# Patient Record
Sex: FEMALE
[De-identification: names, ages, dates, MRNs, and addresses within clinical notes are randomized per-mention and may not be internally consistent; named-entity substitution may affect disease eponyms.]

---

## 2022-04-18 ENCOUNTER — RX ONLY (RX ONLY)
Age: 57
End: 2022-04-18

## 2022-04-18 ENCOUNTER — DOCTOR'S OFFICE (OUTPATIENT)
Dept: URBAN - NONMETROPOLITAN AREA CLINIC 1 | Facility: CLINIC | Age: 57
Setting detail: OPHTHALMOLOGY
End: 2022-04-18
Payer: COMMERCIAL

## 2022-04-18 DIAGNOSIS — H40.013: ICD-10-CM

## 2022-04-18 DIAGNOSIS — H02.433: ICD-10-CM

## 2022-04-18 DIAGNOSIS — H02.834: ICD-10-CM

## 2022-04-18 DIAGNOSIS — H02.403: ICD-10-CM

## 2022-04-18 DIAGNOSIS — H02.423: ICD-10-CM

## 2022-04-18 DIAGNOSIS — H04.123: ICD-10-CM

## 2022-04-18 DIAGNOSIS — H02.831: ICD-10-CM

## 2022-04-18 DIAGNOSIS — H25.13: ICD-10-CM

## 2022-04-18 DIAGNOSIS — H02.413: ICD-10-CM

## 2022-04-18 PROCEDURE — 76514 ECHO EXAM OF EYE THICKNESS: CPT | Performed by: OPHTHALMOLOGY

## 2022-04-18 PROCEDURE — 99204 OFFICE O/P NEW MOD 45 MIN: CPT | Performed by: OPHTHALMOLOGY

## 2022-04-18 PROCEDURE — 92133 CPTRZD OPH DX IMG PST SGM ON: CPT | Performed by: OPHTHALMOLOGY

## 2022-04-18 ASSESSMENT — CONFRONTATIONAL VISUAL FIELD TEST (CVF)
OD_FINDINGS: FULL
OS_FINDINGS: FULL

## 2022-04-18 ASSESSMENT — KERATOMETRY
OS_K1POWER_DIOPTERS: 43.75
OD_AXISANGLE_DEGREES: 062
OD_K2POWER_DIOPTERS: 44.50
OS_K2POWER_DIOPTERS: 45.00
OD_K1POWER_DIOPTERS: 43.75
OS_AXISANGLE_DEGREES: 089

## 2022-04-18 ASSESSMENT — REFRACTION_CURRENTRX
OS_OVR_VA: 20/
OD_VPRISM_DIRECTION: SV
OD_SPHERE: -2.00
OS_CYLINDER: -0.75
OS_SPHERE: -2.25
OS_VPRISM_DIRECTION: SV
OD_OVR_VA: 20/
OD_AXIS: 176
OS_AXIS: 005
OD_CYLINDER: -0.50

## 2022-04-18 ASSESSMENT — TEAR BREAK UP TIME (TBUT)
OD_TBUT: RAPID TBUT
OS_TBUT: RAPID TBUT

## 2022-04-18 ASSESSMENT — REFRACTION_AUTOREFRACTION
OD_CYLINDER: 0.00
OD_SPHERE: -1.50
OS_AXIS: 156
OS_CYLINDER: -1.00
OS_SPHERE: -1.50

## 2022-04-18 ASSESSMENT — PACHYMETRY
OD_CT_CORRECTION: 1
OS_CT_CORRECTION: 1
OD_CT_UM: 525
OS_CT_UM: 525

## 2022-04-18 ASSESSMENT — VISUAL ACUITY
OS_BCVA: 20/20-2
OD_BCVA: 20/25+2

## 2022-04-18 ASSESSMENT — LID POSITION - DERMATOCHALASIS
OS_DERMATOCHALASIS: 1+
OD_DERMATOCHALASIS: 1+

## 2022-04-18 ASSESSMENT — AXIALLENGTH_DERIVED
OS_AL: 24.0588
OD_AL: 23.9522

## 2022-04-18 ASSESSMENT — SPHEQUIV_DERIVED
OD_SPHEQUIV: -1.5
OS_SPHEQUIV: -2

## 2022-04-18 ASSESSMENT — LID POSITION - PTOSIS
OD_PTOSIS: 1+
OS_PTOSIS: 1+

## 2022-04-18 ASSESSMENT — DRY EYES - PHYSICIAN NOTES
OS_GENERALCOMMENTS: 2+PEE
OD_GENERALCOMMENTS: 2+PEE

## 2022-04-18 ASSESSMENT — TONOMETRY
OS_IOP_MMHG: 16
OD_IOP_MMHG: 15

## 2022-05-24 ENCOUNTER — DOCTOR'S OFFICE (OUTPATIENT)
Dept: URBAN - NONMETROPOLITAN AREA CLINIC 1 | Facility: CLINIC | Age: 57
Setting detail: OPHTHALMOLOGY
End: 2022-05-24
Payer: COMMERCIAL

## 2022-05-24 DIAGNOSIS — H25.13: ICD-10-CM

## 2022-05-24 DIAGNOSIS — H35.373: ICD-10-CM

## 2022-05-24 DIAGNOSIS — H40.023: ICD-10-CM

## 2022-05-24 DIAGNOSIS — H04.123: ICD-10-CM

## 2022-05-24 DIAGNOSIS — H02.831: ICD-10-CM

## 2022-05-24 PROCEDURE — 92134 CPTRZ OPH DX IMG PST SGM RTA: CPT | Performed by: OPHTHALMOLOGY

## 2022-05-24 PROCEDURE — 92020 GONIOSCOPY: CPT | Performed by: OPHTHALMOLOGY

## 2022-05-24 PROCEDURE — 99214 OFFICE O/P EST MOD 30 MIN: CPT | Performed by: OPHTHALMOLOGY

## 2022-05-24 PROCEDURE — 92083 EXTENDED VISUAL FIELD XM: CPT | Performed by: OPHTHALMOLOGY

## 2022-05-24 ASSESSMENT — REFRACTION_CURRENTRX
OD_VPRISM_DIRECTION: SV
OD_SPHERE: -2.00
OS_OVR_VA: 20/
OS_CYLINDER: -0.75
OD_CYLINDER: -0.50
OS_VPRISM_DIRECTION: SV
OS_AXIS: 005
OD_OVR_VA: 20/
OD_AXIS: 176
OS_SPHERE: -2.25

## 2022-05-24 ASSESSMENT — REFRACTION_AUTOREFRACTION
OS_AXIS: 163
OD_SPHERE: -1.50
OD_CYLINDER: -0.50
OS_SPHERE: -1.50
OS_CYLINDER: -0.75
OD_AXIS: 149

## 2022-05-24 ASSESSMENT — KERATOMETRY
OS_K2POWER_DIOPTERS: 45.25
OD_AXISANGLE_DEGREES: 93
OS_K1POWER_DIOPTERS: 44.00
OS_AXISANGLE_DEGREES: 87
OD_K2POWER_DIOPTERS: .00
OD_K1POWER_DIOPTERS: 45.00

## 2022-05-24 ASSESSMENT — SPHEQUIV_DERIVED
OD_SPHEQUIV: -1.75
OS_SPHEQUIV: -1.875

## 2022-05-24 ASSESSMENT — LID POSITION - DERMATOCHALASIS
OD_DERMATOCHALASIS: 1+
OS_DERMATOCHALASIS: 1+

## 2022-05-24 ASSESSMENT — DRY EYES - PHYSICIAN NOTES
OS_GENERALCOMMENTS: 2+PEE
OD_GENERALCOMMENTS: 2+PEE

## 2022-05-24 ASSESSMENT — AXIALLENGTH_DERIVED: OS_AL: 23.9135

## 2022-05-24 ASSESSMENT — LID POSITION - PTOSIS
OD_PTOSIS: 1+
OS_PTOSIS: 1+

## 2022-05-24 ASSESSMENT — TEAR BREAK UP TIME (TBUT)
OS_TBUT: RAPID TBUT
OD_TBUT: RAPID TBUT

## 2022-05-24 ASSESSMENT — PACHYMETRY
OD_CT_CORRECTION: 1
OS_CT_UM: 526
OD_CT_UM: 525
OS_CT_CORRECTION: 1

## 2022-05-24 ASSESSMENT — TONOMETRY
OD_IOP_MMHG: 14
OS_IOP_MMHG: 13

## 2022-05-24 ASSESSMENT — VISUAL ACUITY
OD_BCVA: 20/20-1
OS_BCVA: 20/20-2

## 2024-05-23 ENCOUNTER — OFFICE VISIT (OUTPATIENT)
Dept: FAMILY MEDICINE CLINIC | Facility: CLINIC | Age: 59
End: 2024-05-23
Payer: COMMERCIAL

## 2024-05-23 VITALS
OXYGEN SATURATION: 100 % | TEMPERATURE: 96.9 F | WEIGHT: 145.94 LBS | SYSTOLIC BLOOD PRESSURE: 122 MMHG | DIASTOLIC BLOOD PRESSURE: 82 MMHG | HEIGHT: 61 IN | BODY MASS INDEX: 27.55 KG/M2 | HEART RATE: 68 BPM

## 2024-05-23 DIAGNOSIS — Z00.00 ANNUAL PHYSICAL EXAM: Primary | ICD-10-CM

## 2024-05-23 DIAGNOSIS — Z11.59 NEED FOR HEPATITIS C SCREENING TEST: ICD-10-CM

## 2024-05-23 DIAGNOSIS — Z13.1 SCREENING FOR DIABETES MELLITUS: ICD-10-CM

## 2024-05-23 DIAGNOSIS — Z13.6 SCREENING FOR CARDIOVASCULAR CONDITION: ICD-10-CM

## 2024-05-23 DIAGNOSIS — M81.0 AGE-RELATED OSTEOPOROSIS WITHOUT CURRENT PATHOLOGICAL FRACTURE: ICD-10-CM

## 2024-05-23 DIAGNOSIS — E55.9 VITAMIN D DEFICIENCY: ICD-10-CM

## 2024-05-23 DIAGNOSIS — Z12.31 ENCOUNTER FOR SCREENING MAMMOGRAM FOR MALIGNANT NEOPLASM OF BREAST: ICD-10-CM

## 2024-05-23 DIAGNOSIS — Z78.0 POSTMENOPAUSAL: ICD-10-CM

## 2024-05-23 DIAGNOSIS — Z11.4 SCREENING FOR HIV (HUMAN IMMUNODEFICIENCY VIRUS): ICD-10-CM

## 2024-05-23 PROCEDURE — 99386 PREV VISIT NEW AGE 40-64: CPT | Performed by: FAMILY MEDICINE

## 2024-05-23 RX ORDER — LANOLIN ALCOHOL/MO/W.PET/CERES
1 CREAM (GRAM) TOPICAL 3 TIMES DAILY
COMMUNITY

## 2024-05-23 RX ORDER — B-COMPLEX WITH VITAMIN C
1 TABLET ORAL
COMMUNITY

## 2024-05-23 NOTE — PROGRESS NOTES
Adult Annual Physical  Name: Samantha Desai      : 1965      MRN: [id number]  Encounter Provider: Yamile Gonzalez DO  Encounter Date: 2024   Encounter department: [facility] PRIMARY CARE    Assessment & Plan   1. Annual physical exam  2. Need for hepatitis C screening test  -     Hepatitis C Antibody; Future  3. Vitamin D deficiency  Assessment & Plan:  Vitamin D low in 2020, also with history of osteoporosis  Recheck vitamin D level at this time and continue daily supplementation  Orders:  -     Vitamin D 25 hydroxy; Future  4. Screening for HIV (human immunodeficiency virus)  -     HIV 1/2 AG/AB w Reflex [facility] for 2 yr old and above; Future  5. Screening for cardiovascular condition  -     CBC and differential; Future  -     Comprehensive metabolic panel; Future  -     Lipid panel; Future  6. Screening for diabetes mellitus  -     Comprehensive metabolic panel; Future  7. Postmenopausal  -     DXA bone density spine hip and pelvis; Future; Expected date: 2024  8. Age-related osteoporosis without current pathological fracture  Assessment & Plan:  Was on Prolia, but this is no longer covered. Patient will consider Fosamax, but does not want to start this at this time.  She is overdue for repeat DEXA scan, but may not be able to complete this due to cost, as this was not covered by her insurance.  Continue to monitor and encourage ongoing use of daily vitamin D and calcium supplementation as well as regular weightbearing exercises.  Continue to avoid smoking and alcohol.  Orders:  -     DXA bone density spine hip and pelvis; Future; Expected date: 2024  9. Encounter for screening mammogram for malignant neoplasm of breast  -     Mammo screening bilateral w 3d & cad; Future    Immunizations and preventive care screenings were discussed with patient today. Appropriate education was printed on patient's after visit summary.    Counseling:  Alcohol/drug use: discussed  moderation in alcohol intake, the recommendations for healthy alcohol use, and avoidance of illicit drug use.  Dental Health: discussed importance of regular tooth brushing, flossing, and dental visits.  Exercise: the importance of regular exercise/physical activity was discussed. Recommend exercise 3-5 times per week for at least 30 minutes.       Depression Screening and Follow-up Plan: Patient was screened for depression during today's encounter. They screened negative with a PHQ-2 score of 0.        History of Present Illness     Chief Complaint   Patient presents with    Establish Care   Used to See Dr. Olivo in [facility]    PMH: Osteoporosis (used to be on Prolia, but not covered any longer), T8 fracture,  SurgHx: SI joint history on the left,  x 2, left arm surgery  Allergies: None  Medications: Takes regular supplements: Zinc, calcium, vitamin D, vitamin C, conjoint and/glucosamine  FamHx: Mother-CAD  SocialHx:   Tobacco: None   Alcohol: None   Relationship: , currently in long term relationship, has two children ages 30 and 33 and two grandchildren    Career: Works at [employer] in [place]      Adult Annual Physical:  Patient presents for annual physical.     Diet and Physical Activity:  - Diet/Nutrition: well balanced diet.  - Exercise: walking and 5-7 times a week on average.    Depression Screening:  - PHQ-2 Score: 0    General Health:  - Sleep: sleeps well.  - Hearing: normal hearing bilateral ears.  - Vision: goes for regular eye exams.  - Dental: regular dental visits.    /GYN Health:  - Follows with GYN: yes.   - Menopause: postmenopausal.   - History of STDs: no      Review of Systems   Constitutional:  Negative for activity change, appetite change, chills, diaphoresis, fever and unexpected weight change.   HENT:  Negative for congestion, rhinorrhea, sore throat and trouble swallowing.    Eyes:  Negative for visual disturbance.   Respiratory:  Negative for cough,  "shortness of breath and wheezing.    Cardiovascular:  Negative for chest pain, palpitations and leg swelling.   Gastrointestinal:  Negative for abdominal pain, blood in stool, constipation and diarrhea.   Genitourinary:  Negative for difficulty urinating, dysuria, frequency and hematuria.   Musculoskeletal:  Negative for arthralgias, back pain and joint swelling.   Skin:  Negative for color change and rash.   Neurological:  Negative for dizziness, light-headedness and headaches.   Psychiatric/Behavioral:  Negative for dysphoric mood. The patient is not nervous/anxious.      Current Outpatient Medications on File Prior to Visit   Medication Sig Dispense Refill    ascorbic acid (VITAMIN C) 250 MG CHEW Chew 250 mg daily      calcium carbonate-vitamin D 500 mg-5 mcg per tablet Take 1 tablet by mouth daily with breakfast      glucosamine-chondroitin 500-400 MG tablet Take 1 tablet by mouth 3 (three) times a day       No current facility-administered medications on file prior to visit.      Social History     Tobacco Use    Smoking status: Never     Passive exposure: Never    Smokeless tobacco: Never   Vaping Use    Vaping status: Never Used   Substance and Sexual Activity    Alcohol use: Yes     Comment: social    Drug use: Never    Sexual activity: Not on file       Objective     /82 (BP Location: Right arm, Patient Position: Sitting, Cuff Size: Standard)   Pulse 68   Temp (!) 96.9 °F (36.1 °C) (Tympanic)   Ht 5' 1\" (1.549 m)   Wt 66.2 kg (145 lb 15.1 oz)   SpO2 100%   BMI 27.58 kg/m²     Physical Exam  Vitals reviewed.   Constitutional:       Appearance: Normal appearance. She is normal weight.   HENT:      Head: Normocephalic and atraumatic.      Right Ear: Tympanic membrane, ear canal and external ear normal. There is no impacted cerumen.      Left Ear: Tympanic membrane, ear canal and external ear normal. There is no impacted cerumen.      Nose: Nose normal. No congestion.      Mouth/Throat:      Mouth: " Mucous membranes are moist.      Pharynx: Oropharynx is clear. No oropharyngeal exudate.   Eyes:      Extraocular Movements: Extraocular movements intact.      Conjunctiva/sclera: Conjunctivae normal.   Neck:      Thyroid: No thyroid mass or thyromegaly.   Cardiovascular:      Rate and Rhythm: Normal rate and regular rhythm.      Pulses: Normal pulses.      Heart sounds: Normal heart sounds. No murmur heard.  Pulmonary:      Effort: Pulmonary effort is normal. No respiratory distress.      Breath sounds: Normal breath sounds. No wheezing.   Abdominal:      General: Abdomen is flat. Bowel sounds are normal. There is no distension.      Palpations: Abdomen is soft.      Tenderness: There is no abdominal tenderness.   Musculoskeletal:      Cervical back: Normal range of motion and neck supple.      Right lower leg: No edema.      Left lower leg: No edema.   Skin:     General: Skin is warm and dry.   Neurological:      General: No focal deficit present.      Mental Status: She is alert.   Psychiatric:         Mood and Affect: Mood normal.         Behavior: Behavior normal.       Administrative Statements

## 2024-05-23 NOTE — ASSESSMENT & PLAN NOTE
Vitamin D low in 2020, also with history of osteoporosis  Recheck vitamin D level at this time and continue daily supplementation

## 2024-05-23 NOTE — ASSESSMENT & PLAN NOTE
Was on Prolia, but this is no longer covered. Patient will consider Fosamax, but does not want to start this at this time.  She is overdue for repeat DEXA scan, but may not be able to complete this due to cost, as this was not covered by her insurance.  Continue to monitor and encourage ongoing use of daily vitamin D and calcium supplementation as well as regular weightbearing exercises.  Continue to avoid smoking and alcohol.

## 2024-06-18 ENCOUNTER — TELEPHONE (OUTPATIENT)
Dept: MAMMOGRAPHY | Facility: CLINIC | Age: 59
End: 2024-06-18

## 2024-07-19 ENCOUNTER — APPOINTMENT (OUTPATIENT)
Dept: RADIOLOGY | Facility: CLINIC | Age: 59
End: 2024-07-19
Payer: COMMERCIAL

## 2024-07-19 ENCOUNTER — HOSPITAL ENCOUNTER (OUTPATIENT)
Dept: RADIOLOGY | Facility: CLINIC | Age: 59
End: 2024-07-19
Payer: COMMERCIAL

## 2024-07-19 VITALS — HEIGHT: 61 IN | BODY MASS INDEX: 27.94 KG/M2 | WEIGHT: 148 LBS

## 2024-07-19 DIAGNOSIS — Z12.31 ENCOUNTER FOR SCREENING MAMMOGRAM FOR MALIGNANT NEOPLASM OF BREAST: ICD-10-CM

## 2024-07-19 PROCEDURE — 77063 BREAST TOMOSYNTHESIS BI: CPT

## 2024-07-19 PROCEDURE — 77067 SCR MAMMO BI INCL CAD: CPT

## 2024-07-26 ENCOUNTER — HOSPITAL ENCOUNTER (OUTPATIENT)
Dept: RADIOLOGY | Facility: CLINIC | Age: 59
End: 2024-07-26
Payer: COMMERCIAL

## 2024-07-26 VITALS — HEIGHT: 61 IN | WEIGHT: 143.2 LBS | BODY MASS INDEX: 27.03 KG/M2

## 2024-07-26 DIAGNOSIS — Z78.0 POSTMENOPAUSAL: ICD-10-CM

## 2024-07-26 DIAGNOSIS — M81.0 AGE-RELATED OSTEOPOROSIS WITHOUT CURRENT PATHOLOGICAL FRACTURE: ICD-10-CM

## 2024-07-26 PROCEDURE — 77080 DXA BONE DENSITY AXIAL: CPT

## 2024-07-31 ENCOUNTER — TELEPHONE (OUTPATIENT)
Dept: FAMILY MEDICINE CLINIC | Facility: CLINIC | Age: 59
End: 2024-07-31

## 2024-07-31 DIAGNOSIS — M81.0 AGE-RELATED OSTEOPOROSIS WITHOUT CURRENT PATHOLOGICAL FRACTURE: Primary | ICD-10-CM

## 2024-07-31 NOTE — TELEPHONE ENCOUNTER
----- Message from Yamile Gonzalez DO sent at 7/31/2024 11:18 AM EDT -----  Message sent via [employer] regarding results - it says patient may not receive notification of the message so please call an relay the message just in case she does not see it in [employer].

## 2024-07-31 NOTE — TELEPHONE ENCOUNTER
Spoke with Samantha and related the message per Carlos and patient stated that she will not take the Fosamax. Patient use to take Prolia in the past and will like to know if that medication can be prescribed instead. Please advise.

## 2024-07-31 NOTE — TELEPHONE ENCOUNTER
"Attempted to reach Samantha to relate the message per Carlos \"Your DEXA scan is consistent with osteoporosis as previously known.  I cannot compare to prior results, but based on these current images, the lumbar spine is the only area in the osteoporotic range by 0.1.  The left hip is in the normal range and the left femoral neck is just into the osteopenic range.  I recommend you continue with vitamin D and calcium supplementation.  Additionally I would recommend as discussed previously that we do Fosamax weekly, and repeat scan in 1 to 2 years.  Please let me know if this is a medication you would like to start, and I can send it in for you to the pharmacy. Otherwise we will continue with regular monitoring.\" Patient didn't answer/ left a voicemail.      "

## 2024-08-15 ENCOUNTER — TELEPHONE (OUTPATIENT)
Age: 59
End: 2024-08-15

## 2024-08-15 NOTE — TELEPHONE ENCOUNTER
Called patient left a message to come to the practice to fill a form for medical release. We do not accept a link from her phone

## 2024-08-15 NOTE — TELEPHONE ENCOUNTER
Patient called back about her medical records from former practice. I did not see them scanned to chart. She said she might have to stop in to show you here phone with the link to the records.

## 2024-08-19 ENCOUNTER — TELEPHONE (OUTPATIENT)
Age: 59
End: 2024-08-19

## 2024-08-19 NOTE — TELEPHONE ENCOUNTER
Pt called to inform that her previous office is trying to fax over her records and each time they are getting a message saying that the line is busy. They even tried faxing late at night. Pt did not wish to have records sent through central fax. Please review and contact pt

## 2025-02-13 ENCOUNTER — TELEPHONE (OUTPATIENT)
Age: 60
End: 2025-02-13

## 2025-02-13 DIAGNOSIS — Z12.31 ENCOUNTER FOR SCREENING MAMMOGRAM FOR BREAST CANCER: Primary | ICD-10-CM

## 2025-02-13 NOTE — TELEPHONE ENCOUNTER
Patient called and is due for her mammo in July.  She would like the order entered now so she can get the appt date in July.  She is worried that if she waits to get the order entered that she will have to wait until September for her mammogram.   Pls call and advise

## 2025-06-06 ENCOUNTER — OFFICE VISIT (OUTPATIENT)
Dept: FAMILY MEDICINE CLINIC | Facility: CLINIC | Age: 60
End: 2025-06-06
Payer: COMMERCIAL

## 2025-06-06 VITALS
DIASTOLIC BLOOD PRESSURE: 68 MMHG | WEIGHT: 150.79 LBS | HEART RATE: 81 BPM | OXYGEN SATURATION: 97 % | BODY MASS INDEX: 28.49 KG/M2 | SYSTOLIC BLOOD PRESSURE: 114 MMHG

## 2025-06-06 DIAGNOSIS — G57.11 LATERAL FEMORAL CUTANEOUS NEUROPATHY, RIGHT: ICD-10-CM

## 2025-06-06 DIAGNOSIS — M81.0 AGE-RELATED OSTEOPOROSIS WITHOUT CURRENT PATHOLOGICAL FRACTURE: ICD-10-CM

## 2025-06-06 DIAGNOSIS — Z13.1 SCREENING FOR DIABETES MELLITUS: ICD-10-CM

## 2025-06-06 DIAGNOSIS — E55.9 VITAMIN D DEFICIENCY: ICD-10-CM

## 2025-06-06 DIAGNOSIS — Z13.6 SCREENING FOR CARDIOVASCULAR CONDITION: ICD-10-CM

## 2025-06-06 DIAGNOSIS — M25.512 CHRONIC LEFT SHOULDER PAIN: ICD-10-CM

## 2025-06-06 DIAGNOSIS — Z23 ENCOUNTER FOR IMMUNIZATION: ICD-10-CM

## 2025-06-06 DIAGNOSIS — Z11.59 NEED FOR HEPATITIS C SCREENING TEST: ICD-10-CM

## 2025-06-06 DIAGNOSIS — Z11.4 SCREENING FOR HIV (HUMAN IMMUNODEFICIENCY VIRUS): ICD-10-CM

## 2025-06-06 DIAGNOSIS — G89.29 CHRONIC LEFT SHOULDER PAIN: ICD-10-CM

## 2025-06-06 DIAGNOSIS — Z00.00 ANNUAL PHYSICAL EXAM: Primary | ICD-10-CM

## 2025-06-06 PROCEDURE — 99396 PREV VISIT EST AGE 40-64: CPT | Performed by: FAMILY MEDICINE

## 2025-06-06 PROCEDURE — 90715 TDAP VACCINE 7 YRS/> IM: CPT

## 2025-06-06 PROCEDURE — 90471 IMMUNIZATION ADMIN: CPT

## 2025-06-06 PROCEDURE — 99214 OFFICE O/P EST MOD 30 MIN: CPT | Performed by: FAMILY MEDICINE

## 2025-06-06 NOTE — ASSESSMENT & PLAN NOTE
Discussed conservative measure recommendations for management at this time including physical therapy, as needed NSAIDs, regular home exercises and stretches, Voltaren gel, heat/ice as needed  Patient declines physical therapy at this time  She admits many years ago she had imaging done of the left shoulder and is going to work to get us records of those.  Follow-up as needed

## 2025-06-06 NOTE — PROGRESS NOTES
Adult Annual Physical  Name: Susie Desai      : 1965      MRN: [id number]  Encounter Provider: Yamile Holley DO  Encounter Date: 2025   Encounter department: [facility] PRIMARY CARE    :  Assessment & Plan  Annual physical exam    Orders:    CBC and Platelet; Future    Comprehensive metabolic panel; Future    Lipid panel; Future    Vitamin D deficiency  Vitamin D low in 2020, also with history of osteoporosis  Recheck vitamin D level at this time and continue daily supplementation    Orders:    Vitamin D 25 hydroxy; Future    Chronic left shoulder pain  Discussed conservative measure recommendations for management at this time including physical therapy, as needed NSAIDs, regular home exercises and stretches, Voltaren gel, heat/ice as needed  Patient declines physical therapy at this time  She admits many years ago she had imaging done of the left shoulder and is going to work to get us records of those.  Follow-up as needed       Age-related osteoporosis without current pathological fracture  DEXA completed 2024 consistent with osteoporosis  Was on Prolia in the past, but this is no longer covered. Patient refuses bisphosphonate.    Continue to monitor and encourage ongoing use of daily vitamin D and calcium supplementation as well as regular weightbearing exercises.  Continue to avoid smoking and alcohol.         Lateral femoral cutaneous neuropathy, right  Confirmed with EMG  Discussed conservative measurement recommendations  Continue to monitor       Screening for diabetes mellitus    Orders:    Comprehensive metabolic panel; Future    Screening for cardiovascular condition    Orders:    CBC and Platelet; Future    Comprehensive metabolic panel; Future    Lipid panel; Future    Need for hepatitis C screening test    Orders:    Hepatitis C Antibody; Future    Screening for HIV (human immunodeficiency virus)    Orders:    HIV 1/2 AG/AB w Reflex [facility] for 2 yr old and  above; Future    Encounter for immunization    Orders:    TDAP VACCINE GREATER THAN OR EQUAL TO 8YO IM      Return in about 1 year (around 6/6/2026) for Annual physical.    Preventive Screenings:  - Diabetes Screening: risks/benefits discussed and orders placed  - Cholesterol Screening: risks/benefits discussed and orders placed   - Hepatitis C screening: risks/benefits discussed and orders placed   - HIV screening: risks/benefits discussed and orders placed   - Cervical cancer screening: screening up-to-date   - Breast cancer screening: screening up-to-date   - Colon cancer screening: screening up-to-date   - Lung cancer screening: screening not indicated   - Osteoporosis screening: has osteoporosis and screening not indicated     Immunizations:  - Immunizations due: Tdap  - Risks/benefits immunizations discussed    - Immunizations given per orders      Counseling/Anticipatory Guidance:    - Dental health: discussed importance of regular tooth brushing, flossing, and dental visits.   - Diet: discussed recommendations for a healthy/well-balanced diet.   - Exercise: the importance of regular exercise/physical activity was discussed. Recommend exercise 3-5 times per week for at least 30 minutes.       Depression Screening and Follow-up Plan: Patient was screened for depression during today's encounter. They screened negative with a PHQ-2 score of 0.          History of Present Illness     Adult Annual Physical:  Patient presents for annual physical.     Diet and Physical Activity:  - Diet/Nutrition: well balanced diet.  - Exercise: walking, 30-60 minutes on average and 3-4 times a week on average.    Depression Screening:  - PHQ-2 Score: 0    General Health:  - Sleep:. 6-7 hours on acerage  - Hearing: normal hearing bilateral ears.  - Vision: wears glasses and most recent eye exam < 1 year ago.  - Dental: regular dental visits.    /GYN Health:  - Follows with GYN: yes.   - Menopause: postmenopausal.   - History of  STDs: no  - Contraception: menopause.      Review of Systems   Constitutional:  Negative for activity change, appetite change, chills, diaphoresis, fever and unexpected weight change.   HENT:  Negative for congestion, rhinorrhea, sore throat and trouble swallowing.    Eyes:  Negative for visual disturbance.   Respiratory:  Negative for cough, shortness of breath and wheezing.    Cardiovascular:  Negative for chest pain, palpitations and leg swelling.   Gastrointestinal:  Negative for abdominal pain, blood in stool, constipation and diarrhea.   Genitourinary:  Negative for difficulty urinating, dysuria, frequency and hematuria.   Musculoskeletal:  Positive for arthralgias. Negative for back pain and joint swelling.   Skin:  Negative for color change and rash.   Neurological:  Negative for dizziness, light-headedness and headaches.   Psychiatric/Behavioral:  Negative for dysphoric mood. The patient is not nervous/anxious.      Medications Ordered Prior to Encounter[1]   Social History[2]    Objective   /68 (BP Location: Left arm, Patient Position: Sitting, Cuff Size: Large)   Pulse 81   Wt 68.4 kg (150 lb 12.7 oz)   SpO2 97%   BMI 28.49 kg/m²     Physical Exam  Vitals reviewed.   Constitutional:       Appearance: Normal appearance. She is normal weight.   HENT:      Head: Normocephalic and atraumatic.      Right Ear: External ear normal.      Left Ear: External ear normal.      Nose: Nose normal. No congestion.     Eyes:      Extraocular Movements: Extraocular movements intact.      Conjunctiva/sclera: Conjunctivae normal.     Neck:      Thyroid: No thyroid mass or thyromegaly.     Cardiovascular:      Rate and Rhythm: Normal rate and regular rhythm.      Pulses: Normal pulses.      Heart sounds: Normal heart sounds. No murmur heard.  Pulmonary:      Effort: Pulmonary effort is normal. No respiratory distress.      Breath sounds: Normal breath sounds. No wheezing.   Abdominal:      General: Abdomen is flat.  Bowel sounds are normal. There is no distension.      Palpations: Abdomen is soft.      Tenderness: There is no abdominal tenderness.     Musculoskeletal:      Cervical back: Normal range of motion and neck supple.      Right lower leg: No edema.      Left lower leg: No edema.     Skin:     General: Skin is warm and dry.     Neurological:      General: No focal deficit present.      Mental Status: She is alert.      Gait: Gait normal.     Psychiatric:         Mood and Affect: Mood normal.         Behavior: Behavior normal.              [1]   Current Outpatient Medications on File Prior to Visit   Medication Sig Dispense Refill    ascorbic acid (VITAMIN C) 250 MG CHEW Chew 250 mg in the morning.      calcium carbonate-vitamin D 500 mg-5 mcg per tablet Take 1 tablet by mouth daily with breakfast      glucosamine-chondroitin 500-400 MG tablet Take 1 tablet by mouth in the morning and 1 tablet in the evening and 1 tablet before bedtime.       No current facility-administered medications on file prior to visit.   [2]   Social History  Tobacco Use    Smoking status: Never     Passive exposure: Never    Smokeless tobacco: Never   Vaping Use    Vaping status: Never Used   Substance and Sexual Activity    Alcohol use: Yes     Comment: social    Drug use: Never

## 2025-06-06 NOTE — ASSESSMENT & PLAN NOTE
Vitamin D low in 2020, also with history of osteoporosis  Recheck vitamin D level at this time and continue daily supplementation    Orders:    Vitamin D 25 hydroxy; Future

## 2025-06-06 NOTE — PATIENT INSTRUCTIONS
Pap screening at Dr. Sabillon - need records   Colonoscopy 2 years ago in [place] - need records to clear CG

## 2025-06-06 NOTE — ASSESSMENT & PLAN NOTE
DEXA completed 7/2024 consistent with osteoporosis  Was on Prolia in the past, but this is no longer covered. Patient refuses bisphosphonate.    Continue to monitor and encourage ongoing use of daily vitamin D and calcium supplementation as well as regular weightbearing exercises.  Continue to avoid smoking and alcohol.

## 2025-07-25 ENCOUNTER — HOSPITAL ENCOUNTER (OUTPATIENT)
Dept: RADIOLOGY | Facility: CLINIC | Age: 60
End: 2025-07-25
Payer: COMMERCIAL

## 2025-07-25 VITALS — BODY MASS INDEX: 27.94 KG/M2 | WEIGHT: 148 LBS | HEIGHT: 61 IN

## 2025-07-25 DIAGNOSIS — Z12.31 ENCOUNTER FOR SCREENING MAMMOGRAM FOR BREAST CANCER: ICD-10-CM

## 2025-07-25 PROCEDURE — 77067 SCR MAMMO BI INCL CAD: CPT

## 2025-07-25 PROCEDURE — 77063 BREAST TOMOSYNTHESIS BI: CPT
